# Patient Record
Sex: MALE
[De-identification: names, ages, dates, MRNs, and addresses within clinical notes are randomized per-mention and may not be internally consistent; named-entity substitution may affect disease eponyms.]

---

## 2020-01-30 LAB — PATHOLOGIST NAME: NORMAL

## 2020-03-19 ENCOUNTER — HOSPITAL (OUTPATIENT)
Dept: OTHER | Age: 50
End: 2020-03-19
Attending: INTERNAL MEDICINE

## 2020-05-04 ENCOUNTER — V-VISIT (OUTPATIENT)
Dept: FAMILY MEDICINE | Age: 50
End: 2020-05-04

## 2020-05-04 ENCOUNTER — E-ADVICE (OUTPATIENT)
Dept: FAMILY MEDICINE | Age: 50
End: 2020-05-04

## 2020-05-04 DIAGNOSIS — Z20.822 SUSPECTED COVID-19 VIRUS INFECTION: Primary | ICD-10-CM

## 2020-05-04 DIAGNOSIS — Z20.822 EXPOSURE TO COVID-19 VIRUS: ICD-10-CM

## 2020-05-04 DIAGNOSIS — Z71.89 EDUCATED ABOUT COVID-19 VIRUS INFECTION: ICD-10-CM

## 2020-05-04 PROCEDURE — 99443 TELEPHONE E&M BY PHYSICIAN EST PT NOT ORIG PREV 7 DAYS 21-30 MIN: CPT | Performed by: NURSE PRACTITIONER

## 2025-06-11 ENCOUNTER — HOSPITAL ENCOUNTER (OUTPATIENT)
Age: 55
Discharge: HOME | End: 2025-06-11
Payer: COMMERCIAL

## 2025-06-11 VITALS
TEMPERATURE: 98 F | DIASTOLIC BLOOD PRESSURE: 83 MMHG | RESPIRATION RATE: 18 BRPM | HEART RATE: 86 BPM | SYSTOLIC BLOOD PRESSURE: 125 MMHG

## 2025-06-11 DIAGNOSIS — N49.2 ABSCESS OF SCROTUM: Primary | ICD-10-CM

## 2025-06-11 PROCEDURE — 99203 OFFICE O/P NEW LOW 30 MIN: CPT | Performed by: PHYSICIAN ASSISTANT

## 2025-06-11 RX ORDER — SEMAGLUTIDE 2.4 MG/.75ML
2.4 INJECTION, SOLUTION SUBCUTANEOUS WEEKLY
COMMUNITY
Start: 2025-01-28

## 2025-06-11 RX ORDER — CEPHALEXIN 500 MG/1
500 CAPSULE ORAL 4 TIMES DAILY
Qty: 28 CAPSULE | Refills: 0 | Status: SHIPPED | OUTPATIENT
Start: 2025-06-11 | End: 2025-06-18

## 2025-06-11 RX ORDER — IBUPROFEN 600 MG/1
TABLET, FILM COATED ORAL
Qty: 20 TABLET | Refills: 0 | Status: SHIPPED | OUTPATIENT
Start: 2025-06-11

## 2025-06-11 NOTE — ED INITIAL ASSESSMENT (HPI)
Patient arrived ambulatory to room c/o a possible abscess to the right testicle. Patient states he went to another IC and was told it was a possible infected ingrown hair and was told to come here. He noticed it 3 weeks ago. +drainage. No fevers. No penile discharge. No urinary complaints.

## 2025-06-11 NOTE — ED PROVIDER NOTES
Patient Seen in: [facility]    History     Chief Complaint   Patient presents with    Abscess     Stated Complaint: Eval-Male Area    HPI    54-year-old male presents with chief complaint of redness and swelling of right scrotum.  Onset 3 weeks ago, patient states area began to spontaneously drain purulent fluid 3 days ago.  Patient denies fever, chills, abdominal pain, nausea, vomiting, diarrhea, constipation, melena, hematochezia, dysuria, hematuria, flank pain, erythematous tracking, penile lesions, penile discharge.    Past Medical History[1]    Past Surgical History[2]         Family History[3]    Short Social Hx on File[4]    Review of Systems    Positive for stated complaint: Eval-Male Area  Other systems are as noted in HPI.  Constitutional and vital signs reviewed.      All other systems reviewed and negative except as noted above.    PSFH elements reviewed from today and agreed except as otherwise stated in HPI.    Physical Exam     ED Triage Vitals [06/11/25 1544]   /83   Pulse 86   Resp 18   Temp 98 °F (36.7 °C)   Temp src Oral   SpO2    O2 Device None (Room air)       Current:/83   Pulse 86   Temp 98 °F (36.7 °C) (Oral)   Resp 18     PULSE OX within normal limits on room air as interpreted by this provider.  Constitutional: The patient is cooperative. Appears well-developed and well-nourished.  Mild discomfort.  Psychological: Alert, No abnormalities of mood, affect.  Head: Normocephalic/atraumatic.  Eyes: Pupils are equal round reactive to light.  Conjunctiva are within normal limits.  ENT: Oropharynx is clear.  Neck: The neck is supple.  No meningeal signs.  Respiratory: Respiratory effort was normal.  There is no stridor.  Air entry is equal.  Cardiovascular: Regular rate and rhythm.  Capillary refill is brisk.    Gastrointestinal: Abdomen soft, nontender, nondistended.  There is no rebound tenderness or guarding.  No organomegaly is noted. No peritoneal  signs.  Genitourinary: No penile lesions or discharge.  Cremasteric reflex intact bilaterally.  A 1 cm x 1 cm area of raised erythema with central wound opening is present at the right scrotum.  No purulent drainage, erythematous tracking or fluctuance.   Lymphatic: No gross lymphadenopathy noted.  Musculoskeletal: Musculoskeletal system is grossly intact.  There is no obvious deformity.  Neurological: Gross motor movement is intact in all 4 extremities.  Patient exhibits normal speech.  Skin: Skin is normal to inspection, except as documented.  No obvious bruising.  No obvious rash.    ED Course   Labs Reviewed - No data to display  No procedure performed.  MDM     Differential diagnosis including but not limited to cellulitis, abscess, infected wound    Physical exam remained stable as previously documented.  Physical exam findings discussed with patient.    I have given the patient instructions regarding their diagnoses, expectations, follow up, and ER precautions. I explained to the patient that emergent conditions may arise and to go to the ER for new, worsening or any persistent conditions. I've explained the importance of following up with their doctor as instructed. The patient verbalized understanding of the discharge instructions and plan.        Disposition and Plan     Clinical Impression:  1. Abscess of scrotum        Disposition:  Discharge    Follow-up:  Everett Tello MD  [address] [place] [address]  [phone]    Call in 1 day  For follow-up      Medications Prescribed:  Current Discharge Medication List        START taking these medications    Details   ibuprofen 600 MG Oral Tab Take 1 tablet (600 mg total) by mouth every 6 hours with food  Qty: 20 tablet, Refills: 0      cephALEXin 500 MG Oral Cap Take 1 capsule (500 mg total) by mouth 4 (four) times daily for 7 days.  Qty: 28 capsule, Refills: 0                            [1]   Past Medical History:   Essential hypertension     Hyperlipidemia   [2] History reviewed. No pertinent surgical history.  [3] No family history on file.  [4]   Social History  Socioeconomic History    Marital status:    Tobacco Use    Smoking status: Never    Smokeless tobacco: Never   Vaping Use    Vaping status: Never Used     Social Drivers of Health      Received from [facility]    Housing Stability

## (undated) NOTE — LETTER
Date & Time: 6/11/2025, 3:55 PM  Patient: Clay Del Cid  Encounter Provider(s):    Hermelinda Anguiano PA       To Whom It May Concern:    Clay Del Cid was seen and treated in our department on 6/11/2025. He may return to work on 6/16/2025.    If you have any questions or concerns, please do not hesitate to call.        _____________________________  Physician/AP[facility] Signature